# Patient Record
Sex: FEMALE | URBAN - METROPOLITAN AREA
[De-identification: names, ages, dates, MRNs, and addresses within clinical notes are randomized per-mention and may not be internally consistent; named-entity substitution may affect disease eponyms.]

---

## 2017-01-23 ENCOUNTER — TRANSFERRED RECORDS (OUTPATIENT)
Dept: HEALTH INFORMATION MANAGEMENT | Facility: CLINIC | Age: 72
End: 2017-01-23

## 2017-05-30 ENCOUNTER — TRANSFERRED RECORDS (OUTPATIENT)
Dept: HEALTH INFORMATION MANAGEMENT | Facility: CLINIC | Age: 72
End: 2017-05-30

## 2017-06-26 ENCOUNTER — TRANSFERRED RECORDS (OUTPATIENT)
Dept: HEALTH INFORMATION MANAGEMENT | Facility: CLINIC | Age: 72
End: 2017-06-26

## 2017-10-03 ENCOUNTER — TRANSFERRED RECORDS (OUTPATIENT)
Dept: HEALTH INFORMATION MANAGEMENT | Facility: CLINIC | Age: 72
End: 2017-10-03

## 2017-10-12 ENCOUNTER — TRANSFERRED RECORDS (OUTPATIENT)
Dept: HEALTH INFORMATION MANAGEMENT | Facility: CLINIC | Age: 72
End: 2017-10-12

## 2017-12-06 ENCOUNTER — OFFICE VISIT (OUTPATIENT)
Dept: PULMONOLOGY | Facility: CLINIC | Age: 72
End: 2017-12-06
Attending: PHYSICAL MEDICINE & REHABILITATION
Payer: MEDICARE

## 2017-12-06 VITALS
SYSTOLIC BLOOD PRESSURE: 106 MMHG | HEIGHT: 60 IN | HEART RATE: 63 BPM | OXYGEN SATURATION: 95 % | RESPIRATION RATE: 18 BRPM | BODY MASS INDEX: 27.48 KG/M2 | WEIGHT: 140 LBS | DIASTOLIC BLOOD PRESSURE: 65 MMHG

## 2017-12-06 DIAGNOSIS — J84.9 ILD (INTERSTITIAL LUNG DISEASE) (H): ICD-10-CM

## 2017-12-06 DIAGNOSIS — J84.9 ILD (INTERSTITIAL LUNG DISEASE) (H): Primary | ICD-10-CM

## 2017-12-06 DIAGNOSIS — M35.9 DIFFUSE CONNECTIVE TISSUE DISEASE (H): ICD-10-CM

## 2017-12-06 LAB
6 MIN WALK (FT): 1110 FT
6 MIN WALK (M): 338 M
ALBUMIN SERPL-MCNC: 3.7 G/DL (ref 3.4–5)
ALP SERPL-CCNC: 76 U/L (ref 40–150)
ALT SERPL W P-5'-P-CCNC: 15 U/L (ref 0–50)
ANION GAP SERPL CALCULATED.3IONS-SCNC: 10 MMOL/L (ref 3–14)
AST SERPL W P-5'-P-CCNC: 15 U/L (ref 0–45)
BASOPHILS # BLD AUTO: 0 10E9/L (ref 0–0.2)
BASOPHILS NFR BLD AUTO: 0.3 %
BILIRUB DIRECT SERPL-MCNC: <0.1 MG/DL (ref 0–0.2)
BILIRUB SERPL-MCNC: 0.3 MG/DL (ref 0.2–1.3)
BUN SERPL-MCNC: 19 MG/DL (ref 7–30)
CALCIUM SERPL-MCNC: 8.6 MG/DL (ref 8.5–10.1)
CHLORIDE SERPL-SCNC: 105 MMOL/L (ref 94–109)
CK SERPL-CCNC: 34 U/L (ref 30–225)
CO2 SERPL-SCNC: 24 MMOL/L (ref 20–32)
CREAT SERPL-MCNC: 0.75 MG/DL (ref 0.52–1.04)
CRP SERPL-MCNC: <2.9 MG/L (ref 0–8)
DIFFERENTIAL METHOD BLD: NORMAL
ENA JO1 IGG SER-ACNC: <0.2 AI (ref 0–0.9)
ENA SCL70 IGG SER IA-ACNC: <0.2 AI (ref 0–0.9)
ENA SS-A IGG SER IA-ACNC: <0.2 AI (ref 0–0.9)
ENA SS-B IGG SER IA-ACNC: <0.2 AI (ref 0–0.9)
EOSINOPHIL # BLD AUTO: 0.2 10E9/L (ref 0–0.7)
EOSINOPHIL NFR BLD AUTO: 1.6 %
ERYTHROCYTE [DISTWIDTH] IN BLOOD BY AUTOMATED COUNT: 11.9 % (ref 10–15)
ERYTHROCYTE [SEDIMENTATION RATE] IN BLOOD BY WESTERGREN METHOD: 22 MM/H (ref 0–30)
GFR SERPL CREATININE-BSD FRML MDRD: 76 ML/MIN/1.7M2
GLUCOSE SERPL-MCNC: 104 MG/DL (ref 70–99)
HCT VFR BLD AUTO: 39 % (ref 35–47)
HGB BLD-MCNC: 12.5 G/DL (ref 11.7–15.7)
IMM GRANULOCYTES # BLD: 0 10E9/L (ref 0–0.4)
IMM GRANULOCYTES NFR BLD: 0.3 %
LYMPHOCYTES # BLD AUTO: 1.5 10E9/L (ref 0.8–5.3)
LYMPHOCYTES NFR BLD AUTO: 14 %
MCH RBC QN AUTO: 30.6 PG (ref 26.5–33)
MCHC RBC AUTO-ENTMCNC: 32.1 G/DL (ref 31.5–36.5)
MCV RBC AUTO: 96 FL (ref 78–100)
MONOCYTES # BLD AUTO: 0.7 10E9/L (ref 0–1.3)
MONOCYTES NFR BLD AUTO: 6.7 %
NEUTROPHILS # BLD AUTO: 8.2 10E9/L (ref 1.6–8.3)
NEUTROPHILS NFR BLD AUTO: 77.1 %
NRBC # BLD AUTO: 0 10*3/UL
NRBC BLD AUTO-RTO: 0 /100
PLATELET # BLD AUTO: 307 10E9/L (ref 150–450)
POTASSIUM SERPL-SCNC: 3.8 MMOL/L (ref 3.4–5.3)
PROT SERPL-MCNC: 7.4 G/DL (ref 6.8–8.8)
RBC # BLD AUTO: 4.08 10E12/L (ref 3.8–5.2)
RHEUMATOID FACT SER NEPH-ACNC: <20 IU/ML (ref 0–20)
SODIUM SERPL-SCNC: 139 MMOL/L (ref 133–144)
WBC # BLD AUTO: 10.6 10E9/L (ref 4–11)

## 2017-12-06 PROCEDURE — 86200 CCP ANTIBODY: CPT | Performed by: INTERNAL MEDICINE

## 2017-12-06 PROCEDURE — 86235 NUCLEAR ANTIGEN ANTIBODY: CPT | Performed by: INTERNAL MEDICINE

## 2017-12-06 PROCEDURE — 86039 ANTINUCLEAR ANTIBODIES (ANA): CPT | Performed by: INTERNAL MEDICINE

## 2017-12-06 PROCEDURE — 80048 BASIC METABOLIC PNL TOTAL CA: CPT | Performed by: INTERNAL MEDICINE

## 2017-12-06 PROCEDURE — 82787 IGG 1 2 3 OR 4 EACH: CPT | Performed by: INTERNAL MEDICINE

## 2017-12-06 PROCEDURE — 36415 COLL VENOUS BLD VENIPUNCTURE: CPT | Performed by: INTERNAL MEDICINE

## 2017-12-06 PROCEDURE — 86331 IMMUNODIFFUSION OUCHTERLONY: CPT | Performed by: INTERNAL MEDICINE

## 2017-12-06 PROCEDURE — 82085 ASSAY OF ALDOLASE: CPT | Performed by: INTERNAL MEDICINE

## 2017-12-06 PROCEDURE — 86431 RHEUMATOID FACTOR QUANT: CPT | Performed by: INTERNAL MEDICINE

## 2017-12-06 PROCEDURE — 85652 RBC SED RATE AUTOMATED: CPT | Performed by: INTERNAL MEDICINE

## 2017-12-06 PROCEDURE — 99212 OFFICE O/P EST SF 10 MIN: CPT | Mod: ZF

## 2017-12-06 PROCEDURE — 85025 COMPLETE CBC W/AUTO DIFF WBC: CPT | Performed by: INTERNAL MEDICINE

## 2017-12-06 PROCEDURE — 86255 FLUORESCENT ANTIBODY SCREEN: CPT | Performed by: INTERNAL MEDICINE

## 2017-12-06 PROCEDURE — 82784 ASSAY IGA/IGD/IGG/IGM EACH: CPT | Performed by: INTERNAL MEDICINE

## 2017-12-06 PROCEDURE — 86606 ASPERGILLUS ANTIBODY: CPT | Performed by: INTERNAL MEDICINE

## 2017-12-06 PROCEDURE — 86038 ANTINUCLEAR ANTIBODIES: CPT | Performed by: INTERNAL MEDICINE

## 2017-12-06 PROCEDURE — 82550 ASSAY OF CK (CPK): CPT | Performed by: INTERNAL MEDICINE

## 2017-12-06 PROCEDURE — 86140 C-REACTIVE PROTEIN: CPT | Performed by: INTERNAL MEDICINE

## 2017-12-06 PROCEDURE — 80076 HEPATIC FUNCTION PANEL: CPT | Performed by: INTERNAL MEDICINE

## 2017-12-06 RX ORDER — BUPROPION HYDROCHLORIDE 150 MG/1
TABLET ORAL
COMMUNITY
Start: 2017-10-30

## 2017-12-06 RX ORDER — VENLAFAXINE HYDROCHLORIDE 150 MG/1
CAPSULE, EXTENDED RELEASE ORAL
COMMUNITY
Start: 2017-08-15

## 2017-12-06 RX ORDER — TRIAMCINOLONE ACETONIDE 1 MG/G
CREAM TOPICAL
COMMUNITY
Start: 2016-12-08

## 2017-12-06 RX ORDER — LOSARTAN POTASSIUM 100 MG/1
TABLET ORAL
COMMUNITY
Start: 2017-09-27

## 2017-12-06 RX ORDER — TRIAMTERENE AND HYDROCHLOROTHIAZIDE 37.5; 25 MG/1; MG/1
CAPSULE ORAL
COMMUNITY
Start: 2017-11-30

## 2017-12-06 RX ORDER — LEVOTHYROXINE SODIUM 300 UG/1
300 TABLET ORAL
COMMUNITY
Start: 2017-11-24

## 2017-12-06 RX ORDER — TRAZODONE HYDROCHLORIDE 50 MG/1
TABLET, FILM COATED ORAL
COMMUNITY
Start: 2017-10-30

## 2017-12-06 RX ORDER — VENLAFAXINE HYDROCHLORIDE 75 MG/1
75 CAPSULE, EXTENDED RELEASE ORAL
COMMUNITY
Start: 2017-05-12 | End: 2018-05-17

## 2017-12-06 RX ORDER — BENZONATATE 200 MG/1
200 CAPSULE ORAL
COMMUNITY
Start: 2017-01-23

## 2017-12-06 RX ORDER — CELECOXIB 200 MG/1
CAPSULE ORAL
COMMUNITY
Start: 2017-11-30

## 2017-12-06 ASSESSMENT — PAIN SCALES - GENERAL: PAINLEVEL: NO PAIN (0)

## 2017-12-06 NOTE — MR AVS SNAPSHOT
After Visit Summary   12/6/2017    Corinne A Shawgo    MRN: [id number]           Patient Information     Date Of Birth          1945        Visit Information        Provider Department      12/6/2017 9:00 AM Obed Bernard MD [facility]        Today's Diagnoses     ILD (interstitial lung disease) (H)    -  1    Diffuse connective tissue disease (H)           Follow-ups after your visit        Your next 10 appointments already scheduled     Dec 06, 2017 10:30 AM CST   Lab with  LAB   [facility] ([facility])    [address] [place] [id number] [phone]              Future tests that were ordered for you today     Open Future Orders        Priority Expected Expires Ordered    Anti Nuclear Lachelle IgG by IFA with Reflex Routine  3/6/2018 12/6/2017    Erythrocyte sedimentation rate auto Routine  3/6/2018 12/6/2017    CRP inflammation Routine  3/6/2018 12/6/2017    CK total Routine  3/6/2018 12/6/2017    Berenice 1 Antibody IgG Routine  3/6/2018 12/6/2017    Scleroderma Antibody Scl70 GALE IgG Routine  3/6/2018 12/6/2017    Rheumatoid factor Routine  3/6/2018 12/6/2017    SSA Ro GALE Antibody IgG Routine  3/6/2018 12/6/2017    SSB La GALE Antibody IgG Routine  3/6/2018 12/6/2017    Aldolase Routine  3/6/2018 12/6/2017    Hypersensitivity pneumonitis Routine  3/6/2018 12/6/2017    Hypersens Pneumonitis - Farmer's Lung II Routine  3/6/2018 12/6/2017    Antineutrophil cytoplasmic Lachelle IgG Routine  3/6/2018 12/6/2017    IgG Subclasses Routine  3/6/2018 12/6/2017    Cyclic Citrullinated Peptide Antibody IgG Routine  3/6/2018 12/6/2017    Basic metabolic panel Routine  1/5/2018 12/6/2017    Hepatic panel Routine  1/5/2018 12/6/2017    CBC with platelets differential Routine  1/5/2018 12/6/2017            Who to contact     If you have questions or need follow up information about today's clinic visit or your schedule please  "contact [facility] directly at [phone].  Normal or non-critical lab and imaging results will be communicated to you by MyChart, letter or phone within 4 business days after the clinic has received the results. If you do not hear from us within 7 days, please contact the clinic through MyChart or phone. If you have a critical or abnormal lab result, we will notify you by phone as soon as possible.  Submit refill requests through [employer] or call your pharmacy and they will forward the refill request to us. Please allow 3 business days for your refill to be completed.          Additional Information About Your Visit        Mobile [employer] Information     [employer] lets you send messages to your doctor, view your test results, renew your prescriptions, schedule appointments and more. To sign up, go to [email or web address] . Click on \"Log in\" on the left side of the screen, which will take you to the Welcome page. Then click on \"Sign up Now\" on the right side of the page.     You will be asked to enter the access code listed below, as well as some personal information. Please follow the directions to create your username and password.     Your access code is: [email or web address]  Expires: 3/6/2018  9:31 AM     Your access code will  in 90 days. If you need help or a new code, please call your [facility] clinic or [phone].        Care EveryWhere ID     This is your Care EveryWhere ID. This could be used by other organizations to access your [facility] medical records  [id number]        Your Vitals Were     Pulse Respirations Height Pulse Oximetry BMI (Body Mass Index)       63 18 1.524 m (5') 95% 27.34 kg/m2        Blood Pressure from Last 3 Encounters:   17 106/65    Weight from Last 3 Encounters:   17 63.5 kg (140 lb)               Primary Care Provider Office Phone # Fax #    Madison Barber [phone]       [facility] [address]  [place] " [address]        Equal Access to Services     [facility] Center[facility] : Hadii aad ku hadthaonessa Radharomero, wagenioleg byrdpeteha, dameonradha hortondaynedelmi aguirre. So [facility] [phone].    ATENCIÓN: Si habla español, tiene a grady disposición servicios gratuitos de asistencia lingüística. Llame al [phone].    We comply with applicable federal civil rights laws and [place] laws. We do not discriminate on the basis of race, color, national origin, age, disability, sex, sexual orientation, or gender identity.            Thank you!     Thank you for choosing [facility]  for your care. Our goal is always to provide you with excellent care. Hearing back from our patients is one way we can continue to improve our services. Please take a few minutes to complete the written survey that you may receive in the mail after your visit with us. Thank you!             Your Updated Medication List - Protect others around you: Learn how to safely use, store and throw away your medicines at www.disposemymeds.org.          This list is accurate as of: 12/6/17  9:31 AM.  Always use your most recent med list.                   Brand Name Dispense Instructions for use Diagnosis    benzonatate 200 MG capsule    TESSALON     Take 200 mg by mouth    ILD (interstitial lung disease) (H), Diffuse connective tissue disease (H)       buPROPion 150 MG 24 hr tablet    WELLBUTRIN XL     TAKE 1 TABLET BY MOUTH ONCE DAILY IN THE MORNING FOR MOOD    ILD (interstitial lung disease) (H), Diffuse connective tissue disease (H)       celecoxib 200 MG capsule    celeBREX     TAKE 1 CAPSULE (200 MG) BY MOUTH 1 TIME A DAY WITH BREAKFAST    ILD (interstitial lung disease) (H), Diffuse connective tissue disease (H)       denosumab 60 MG/ML Soln injection    PROLIA     Inject 60 mg Subcutaneous    ILD (interstitial lung disease) (H), Diffuse connective tissue disease (H)       hydrocortisone 2.5 % cream    ANUSOL-HC       ILD (interstitial lung disease) (H), Diffuse connective tissue disease (H)       levothyroxine 300 MCG tablet    SYNTHROID/LEVOTHROID     Take 300 mcg by mouth    ILD (interstitial lung disease) (H), Diffuse connective tissue disease (H)       losartan 100 MG tablet    COZAAR     TAKE 1 TABLET BY MOUTH ONCE DAILY    ILD (interstitial lung disease) (H), Diffuse connective tissue disease (H)       omeprazole 20 MG CR capsule    priLOSEC     TAKE 1 CAPSULE (20 MG TOTAL) BY MOUTH 2 TIMES A DAY BEFORE MEALS    ILD (interstitial lung disease) (H), Diffuse connective tissue disease (H)       traZODone 50 MG tablet    DESYREL     TAKE 1 TABLET BY MOUTH ONCE DAILY IN THE EVENING AT BEDTIME    ILD (interstitial lung disease) (H), Diffuse connective tissue disease (H)       triamcinolone 0.1 % cream    KENALOG     APPLY TO AFFECTED AREA 2 TIMES A DAY WITH NYSTATIN CREAM AS NEEDED FOR ITCHING FOR UP TO 5 DAYS---PRN per patient.    ILD (interstitial lung disease) (H), Diffuse connective tissue disease (H)       triamterene-hydrochlorothiazide 37.5-25 MG per capsule    DYAZIDE     TAKE 1 CAPSULE BY MOUTH ONC E A DAY    ILD (interstitial lung disease) (H), Diffuse connective tissue disease (H)       * venlafaxine 75 MG 24 hr capsule    EFFEXOR-XR     Take 75 mg by mouth    ILD (interstitial lung disease) (H), Diffuse connective tissue disease (H)       * venlafaxine 150 MG 24 hr capsule    EFFEXOR-XR     TAKE 1 CAPSULE BY MOUTH ONCE DAILY    ILD (interstitial lung disease) (H), Diffuse connective tissue disease (H)       * Notice:  This list has 2 medication(s) that are the same as other medications prescribed for you. Read the directions carefully, and ask your doctor or other care provider to review them with you.

## 2017-12-06 NOTE — NURSING NOTE
Chief Complaint   Patient presents with     Consult     New consult on Corinne and her ILD     Jan Fontenot CMA at 8:25 AM on 12/6/2017

## 2017-12-06 NOTE — PROGRESS NOTES
The patient is a 72-year-old female referred for consultation by Constanza Magdaleno from [facility], [address], [place] [address].      HISTORY OF PRESENT ILLNESS:  The patient was diagnosed with a mixed connective tissue disease in 2014.  One of her major manifestations of this has been interstitial lung disease.  She has been seen by both Pulmonary and Rheumatology up in [place] for her underlying connective tissue disease.  She has in the past been maintained on CellCept and prednisone.  However, this past summer in June insurance refused to cover CellCept and so she was taken off the CellCept and was switched over to azathioprine.  The patient did not tolerate Imuran and has been off all immunosuppressives since then.  She has noticed that her breathing has gotten somewhat worse.  She states that right now she has 1 block of dyspnea on exertion.  She does note that with exertion she gets short of breath.  For example, in the evening or at nighttime if she has to get up and go to the bathroom she will notice it when she comes back to bed, her heart will be beating fast and she will be quite dyspneic.  The patient does use oxygen 5 liters per minute, predominantly at nighttime, occasionally with exercise.  She is not using oxygen at rest.  Joint wise she states that her hips hurt and occasionally she notices that she will have some aches in her fingers, in particular her left thumb.  She has a sporadic cough for which she uses Tessalon Perles and it is mostly nonproductive.  She denies any recent upper respiratory tract infection.      PAST MEDICAL HISTORY:   1.  Graves' disease diagnosed at age 41, status post radiation, now on some Synthroid replacement.   2.  Mixed connective tissue disease with associated ILD diagnosed 2014.   3.  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     4.  Varicose veins.   5.  Depression.   6.  Pulmonary hypertension, status  post right heart catheterization and was told that she had mild pulmonary hypertension that was felt to be secondary to her interstitial lung disease.      MEDICATIONS:  Includes Celebrex, hydrochlorothiazide, Synthroid, trazodone, Wellbutrin, Prilosec, losartan, venlafaxine, Tessalon Perles, and Prolia injections for osteoporosis.      FAMILY HISTORY:  No family history of lung problems.  Grandmother had arthritis, both grandmother, mother had osteoporosis.  Mother  of stroke.  Father  of heart problems and also had diabetes.      SOCIAL HISTORY:  The patient is a former smoker, smoked only for several years, quit back in .  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  Also had been in buildings that have had asbestos, although she did not work with asbestos.  [place]  They have live in a relatively new home.  There is no mold no hot tub, no exposures to molds that the patient is aware of.  Pets at home include cats.  There are no birds.      REVIEW OF SYSTEMS:     HEENT:  She complains of some decrease in her vision wondering whether or not she might have cataracts.  She also complains of dry eyes.  She does not complain of a dry mouth.   CARDIAC:  Heart review of systems she notes again a fast heart rate with activity but denies chest pain, syncope or other heart problems.   PULMONARY:  Per the HPI.   GASTROINTESTINAL:  Again, she has reflux, which predominantly she thinks is more worse at nighttime, than during the daytime.   GENITOURINARY:  Some urinary frequency.   MUSCULOSKELETAL:  Again, per the HPI.  She complains of some hip discomfort as well as discomfort in her fingers.   The rest of the comprehensive review of systems is negative.      PHYSICAL EXAMINATION:   VITAL SIGNS:  Stable.  Her O2 sats are 95% on room air.   GENERAL:  Pupils are equal, round, reactive to light, no icterus.   Mouth and throat are without lesions, looked like normal saliva.  Parotid glands were not any palpable.   NECK:  No lymphadenopathy was noted.  Thyroid was not palpable.   HEART:  Regular rate and rhythm, normal S1 and S2, no murmur appreciated.   CHEST:  Crackles bilaterally at least half way up, maybe slightly more than half way up.  Clear anterior.   ABDOMEN:  No hepatosplenomegaly, nontender.   MUSCULOSKELETAL:  I do not see evidence of joint inflammation or bogginess on examination of her joints today.   EXTREMITIES:  I do not see evidence of clubbing, cyanosis or edema.   SKIN:  No rash, no nodules noted.   NEUROLOGIC:  Nonfocal.      PULMONARY FUNCTION TESTS:  PFTs were reviewed by me.  She had a 6-minute walk.  Her walk distance was 1110 feet, it is a little bit below the lower limits of normal.  On 5 liters of oxygen her O2 sats fell from 97% down to 90%.      For PFTs spirometry, her FEV1 was 1.26 or 70% of predicted, FVC was 1.47 or 65% of predicted.  Her total lung capacity was 57% of predicted and diffusion capacity is 34% of predicted.  These numbers are somewhat lower than PFTs that were done in [place] over the past year where her FVC had been running between 1.6 and 1.75, so there has been a fall-off in her vital capacity over the past year.      IMAGING:  Outside chest CT scan was reviewed by me.  She has predominantly lower lobe fibrosis, some ground-glass opacities.  It is in a pattern that is consistent with NSIP and there appears to be some progression when compared to prior old CT scans from several years ago.      ASSESSMENT AND PLAN:     1.  In summary then, Mrs. Hinkle is a 72-year-old female with mixed connective tissue disease associated interstitial lung disease.  The patient had been maintained on CellCept and while on Cellcept appeared to be relatively stable; however, since coming off the CellCept has noted increasing shortness of breath and the PFTs have also fallen off.  My  recommendation will be to try to get her back on CellCept.  We will work on seeing whether we can get CellCept and in the meantime because she is currently on treatment, I would recommend that she be started on prednisone 20 mg until she is able to get on full dose CellCept. If patients have been denied Cellcept by insurance, there is a program that can be applied through the  that enables patients access to Cellcept through the . I will send off a connective tissue disease panel on her, as well as markers of inflammation on her and those are pending at the time of this dictation.  I will also review her case at our [facility].  If we are unsuccessful in getting CellCept, then we may need to consider other additional immunosuppressants other than CellCept or Imuran.   2.  Reflux.  It sounds like the patient's reflux is not under optimal control and seems to be worse at night.  She seems reluctant to want to take twice a day Prilosec.  Since her reflux tends to be worse at night and if she is not willing to take twice a day Prilosec, potentially switching from morning Prilosec to nighttime Prilosec might afford her better coverage.   3.  Vaccinations.  She is unclear whether she has had both Prevnar-13 as well as pneumococcal vaccination.  I have asked her to check with Dr. Magdaleno to see whether or not she has had both the pneumococcal vaccinations.  If not, I would recommend that she obtain these.  She has already had a flu shot this year.         Obed Bernard  Pulmonary/Critical Care  December 6, 2017 10:58 AM           cc:  Constanza Magdaleno MD    [address] [address]

## 2017-12-07 LAB
ALDOLASE SERPL-CCNC: 4.8 U/L (ref 1.5–8.1)
ANA PAT SER IF-IMP: ABNORMAL
ANA SER QL IF: POSITIVE
ANA TITR SER IF: ABNORMAL {TITER}
CCP AB SER IA-ACNC: 1 U/ML

## 2017-12-08 LAB
ENA RNP IGG SER IA-ACNC: <0.2 AI (ref 0–0.9)
IGG SERPL-MCNC: 831 MG/DL (ref 695–1620)
IGG1 SER-MCNC: 489 MG/DL (ref 300–856)
IGG2 SER-MCNC: 176 MG/DL (ref 158–761)
IGG3 SER-MCNC: 44 MG/DL (ref 24–192)
IGG4 SER-MCNC: 32 MG/DL (ref 11–86)

## 2017-12-09 LAB — ANCA IGG TITR SER IF: NORMAL {TITER}

## 2017-12-11 LAB
A FLAVUS AB SER QL ID: NORMAL
A FUMIGATUS1 AB SER QL ID: NORMAL
A FUMIGATUS2 AB SER QL: NORMAL
A FUMIGATUS3 AB SER QL ID: NORMAL
A FUMIGATUS6 AB SER QL ID: NORMAL
A PULLULANS AB SER QL ID: NORMAL
LACEYELLA SACCHARI AB SER QL: NORMAL
PIGEON DROP IGE QN: NORMAL
S RECTIVIRGULA AB SER QL ID: NORMAL
S VIRIDIS AB SER QL: NORMAL
T CANDIDUS AB SER QL: NORMAL
T VULGARIS AB SER QL ID: NORMAL

## 2017-12-12 LAB
DLCOUNC-%PRED-PRE: 34 %
DLCOUNC-PRE: 6.51 ML/MIN/MMHG
DLCOUNC-PRED: 18.76 ML/MIN/MMHG
ERV-%PRED-PRE: 78 %
ERV-PRE: 0.38 L
ERV-PRED: 0.49 L
EXPTIME-PRE: 8.2 SEC
FEF2575-%PRED-POST: 162 %
FEF2575-%PRED-PRE: 120 %
FEF2575-POST: 2.58 L/SEC
FEF2575-PRE: 1.91 L/SEC
FEF2575-PRED: 1.59 L/SEC
FEFMAX-%PRED-PRE: 112 %
FEFMAX-PRE: 5.6 L/SEC
FEFMAX-PRED: 4.96 L/SEC
FEV1-%PRED-PRE: 70 %
FEV1-PRE: 1.26 L
FEV1FEV6-PRE: 87 %
FEV1FEV6-PRED: 79 %
FEV1FVC-PRE: 88 %
FEV1FVC-PRED: 79 %
FEV1SVC-PRE: 85 %
FEV1SVC-PRED: 70 %
FIFMAX-PRE: 2.25 L/SEC
FRCPLETH-%PRED-PRE: 54 %
FRCPLETH-PRE: 1.34 L
FRCPLETH-PRED: 2.48 L
FVC-%PRED-PRE: 64 %
FVC-PRE: 1.43 L
FVC-PRED: 2.24 L
IC-%PRED-PRE: 53 %
IC-PRE: 1.1 L
IC-PRED: 2.04 L
RVPLETH-%PRED-PRE: 50 %
RVPLETH-PRE: 0.96 L
RVPLETH-PRED: 1.9 L
TLCPLETH-%PRED-PRE: 57 %
TLCPLETH-PRE: 2.44 L
TLCPLETH-PRED: 4.27 L
VA-%PRED-PRE: 49 %
VA-PRE: 2.19 L
VC-%PRED-PRE: 58 %
VC-PRE: 1.48 L
VC-PRED: 2.53 L

## (undated) RX ORDER — ALBUTEROL SULFATE 0.83 MG/ML
SOLUTION RESPIRATORY (INHALATION)
Status: DISPENSED
Start: 2017-12-06